# Patient Record
Sex: MALE | Race: WHITE | Employment: OTHER | URBAN - METROPOLITAN AREA
[De-identification: names, ages, dates, MRNs, and addresses within clinical notes are randomized per-mention and may not be internally consistent; named-entity substitution may affect disease eponyms.]

---

## 2022-02-02 ENCOUNTER — OFFICE VISIT (OUTPATIENT)
Dept: ORTHOPEDIC SURGERY | Age: 42
End: 2022-02-02
Payer: OTHER GOVERNMENT

## 2022-02-02 VITALS — BODY MASS INDEX: 29.84 KG/M2 | HEIGHT: 75 IN | TEMPERATURE: 98.4 F | WEIGHT: 240 LBS | RESPIRATION RATE: 16 BRPM

## 2022-02-02 DIAGNOSIS — M25.521 CHRONIC ELBOW PAIN, RIGHT: ICD-10-CM

## 2022-02-02 DIAGNOSIS — M75.21 BICEPS TENDINITIS, RIGHT: Primary | ICD-10-CM

## 2022-02-02 DIAGNOSIS — G89.29 CHRONIC ELBOW PAIN, RIGHT: ICD-10-CM

## 2022-02-02 PROCEDURE — 99204 OFFICE O/P NEW MOD 45 MIN: CPT | Performed by: ORTHOPAEDIC SURGERY

## 2022-02-02 RX ORDER — MELOXICAM 15 MG/1
15 TABLET ORAL DAILY
Qty: 90 TABLET | Refills: 2 | Status: SHIPPED | OUTPATIENT
Start: 2022-02-02 | End: 2022-02-02

## 2022-02-02 RX ORDER — PANTOPRAZOLE SODIUM 20 MG/1
TABLET, DELAYED RELEASE ORAL
COMMUNITY
Start: 2021-05-14

## 2022-02-02 RX ORDER — OMEPRAZOLE 10 MG/1
10 CAPSULE, DELAYED RELEASE ORAL
COMMUNITY
Start: 2021-12-08 | End: 2022-03-08

## 2022-02-02 RX ORDER — SUMATRIPTAN 100 MG/1
100 TABLET, FILM COATED ORAL
COMMUNITY
Start: 2021-03-15 | End: 2022-12-08

## 2022-02-02 RX ORDER — MELOXICAM 15 MG/1
15 TABLET ORAL DAILY
Qty: 90 TABLET | Refills: 2 | Status: SHIPPED | OUTPATIENT
Start: 2022-02-02 | End: 2022-02-02 | Stop reason: SDUPTHER

## 2022-02-02 RX ORDER — MELOXICAM 15 MG/1
15 TABLET ORAL DAILY
Qty: 90 TABLET | Refills: 2 | Status: SHIPPED | OUTPATIENT
Start: 2022-02-02 | End: 2022-08-04

## 2022-02-02 NOTE — PROGRESS NOTES
Patient: Vikram Mckeon                MRN: [id number]       SSN: [id number]  YOB: 1980        AGE: 35 y.o. SEX: male  Body mass index is 30.4 kg/m². PCP: Jordy Arango MD  02/02/22    CHIEF COMPLAINT: RIght elbow pain    HPI: Vikram Mckeon is a 35 y.o. male patient who presents to the office today with 4 out of 10 right elbow pain. He describes the pain on the anterior lateral aspect of the elbow. He had similar pain in his left elbow several years ago which got better with physical therapy. He was also having physical therapy for the right elbow but unfortunate with Covid that was stopped. He has not had any specific treatment for the past several years. He describes the pain worse with lifting and carrying things especially when flexing the elbow. History reviewed. No pertinent past medical history. History reviewed. No pertinent family history. Current Outpatient Medications   Medication Sig Dispense Refill    omeprazole (PRILOSEC) 10 mg capsule Take 10 mg by mouth.  pantoprazole (PROTONIX) 20 mg tablet       SUMAtriptan (IMITREX) 100 mg tablet Take 100 mg by mouth.  meloxicam (Mobic) 15 mg tablet Take 1 Tablet by mouth daily. 90 Tablet 2       No Known Allergies    History reviewed. No pertinent surgical history.     Social History     Socioeconomic History    Marital status: UNKNOWN     Spouse name: Not on file    Number of children: Not on file    Years of education: Not on file    Highest education level: Not on file   Occupational History    Not on file   Tobacco Use    Smoking status: Not on file    Smokeless tobacco: Not on file   Substance and Sexual Activity    Alcohol use: Not on file    Drug use: Not on file    Sexual activity: Not on file   Other Topics Concern    Not on file   Social History Narrative    Not on file     Social Determinants of Health     Financial Resource Strain:     Difficulty of Paying Living Expenses: Not on file   Food Insecurity:     Worried About [facility] in the Last Year: Not on file    Kishan of Food in the Last Year: Not on file   Transportation Needs:     Lack of Transportation (Medical): Not on file    Lack of Transportation (Non-Medical): Not on file   Physical Activity:     Days of Exercise per Week: Not on file    Minutes of Exercise per Session: Not on file   Stress:     Feeling of Stress : Not on file   Social Connections:     Frequency of Communication with Friends and Family: Not on file    Frequency of Social Gatherings with Friends and Family: Not on file    Attends Congregation Services: Not on file    Active Member of [address] [place] [address] or Organizations: Not on file    Attends Club or Organization Meetings: Not on file    Marital Status: Not on file   Intimate Partner Violence:     Fear of Current or Ex-Partner: Not on file    Emotionally Abused: Not on file    Physically Abused: Not on file    Sexually Abused: Not on file   Housing Stability:     Unable to Pay for Housing in the Last Year: Not on file    Number of [place] in the Last Year: Not on file    Unstable Housing in the Last Year: Not on file       REVIEW OF SYSTEMS:    14 point review of systems on the intake form is negative except as noted in the HPI    PHYSICAL EXAMINATION:  Visit Vitals  Temp 98.4 °F (36.9 °C) (Temporal)   Resp 16   Ht 6' 2.5\" (1.892 m)   Wt 240 lb (108.9 kg) Comment: pt reports   BMI 30.40 kg/m²     Body mass index is 30.4 kg/m². GENERAL: Alert and oriented x3, in no acute distress, well-developed, well-nourished. HEENT: Normocephalic, atraumatic.     Elbow Exam   R   L  ROM Active      Flexion   Full   Full   Extension  Full   Full   Pronation  Full   Full   Supination  Full   Full  ROM  Passive   Flexion   Full   Full   Extension  Full   Full   Pronation  Full   Full   Supination  Full   Full  Tenderness to palpation   Medial Epicondyle -   -   Lateral Epicondyle -   -  Tinel Sign Cubital Tunnel -   -  Ulnar Nerve Subluxation -   -  Distal Biceps Abnormality -   -  Triceps Abnormality  -   -  Other tenderness  -   -  Other Deformity  -   -  Olecranon Bursitis  -   -  Warmth   -   -  Erythema   -   -  Additional Findings: Slight tenderness to palpation along the biceps sheath in the antecubital fossa right elbow. Slight pain with resisted elbow flexion. IMAGING:  An MRI of the right elbow was reviewed. My interpretation is MRI is no partial distal biceps injury. No significant medial lateral epicondyle injury or flexor extensor mass injury. No arthritis. ASSESSMENT & PLAN  Diagnosis: Right elbow biceps tendinitis, possible lateral epicondylitis    Shanna Enrique has pain in his right elbow that does not appear to have any cause that is surgical on his MRI or his exam.  I think this is likely tendinitis about the elbow. I recommended and prescribed an oral anti-inflammatory today. I also recommended and prescribed physical therapy for the elbow. I will see him back in 2 to 3 months to see how he responds. Prescription medication management discussed. Electronically signed by: Roddy Watkins MD    Note: This note was completed using voice recognition software.   Any typographical/name errors or mistakes are unintentional.

## 2022-02-16 ENCOUNTER — APPOINTMENT (OUTPATIENT)
Dept: PHYSICAL THERAPY | Age: 42
End: 2022-02-16
Attending: ORTHOPAEDIC SURGERY

## 2022-08-04 DIAGNOSIS — M75.21 BICEPS TENDINITIS, RIGHT: ICD-10-CM

## 2022-08-04 RX ORDER — MELOXICAM 15 MG/1
15 TABLET ORAL DAILY
Qty: 90 TABLET | Refills: 2 | Status: SHIPPED | OUTPATIENT
Start: 2022-08-04

## 2022-08-04 NOTE — TELEPHONE ENCOUNTER
Rx Approved
no abdominal pain, no bloating, no constipation, no diarrhea, no nausea and no vomiting.